# Patient Record
Sex: FEMALE | Race: WHITE | Employment: OTHER
[De-identification: names, ages, dates, MRNs, and addresses within clinical notes are randomized per-mention and may not be internally consistent; named-entity substitution may affect disease eponyms.]

---

## 2021-02-05 ENCOUNTER — NURSE TRIAGE (OUTPATIENT)
Dept: OTHER | Facility: CLINIC | Age: 46
End: 2021-02-05

## 2021-02-05 NOTE — TELEPHONE ENCOUNTER
Reason for Disposition   Constant abdominal pain lasting > 2 hours    Answer Assessment - Initial Assessment Questions  1. LOCATION: \"Where does it hurt? \"       Abdominal pain in mid lower abdomen to RLQ abdomen. 2. RADIATION: \"Does the pain shoot anywhere else? \" (e.g., chest, back)      Denies. 3. ONSET: \"When did the pain begin? \" (e.g., minutes, hours or days ago)       Began yesterday morning. 4. SUDDEN: \"Gradual or sudden onset? \"      Came on suddenly, woke her from a sleep at 0200.    5. PATTERN \"Does the pain come and go, or is it constant? \"     - If constant: \"Is it getting better, staying the same, or worsening? \"       (Note: Constant means the pain never goes away completely; most serious pain is constant and it progresses)      - If intermittent: \"How long does it last?\" \"Do you have pain now? \"      (Note: Intermittent means the pain goes away completely between bouts)      Constant with variation in severity. 6. SEVERITY: \"How bad is the pain? \"  (e.g., Scale 1-10; mild, moderate, or severe)    - MILD (1-3): doesn't interfere with normal activities, abdomen soft and not tender to touch     - MODERATE (4-7): interferes with normal activities or awakens from sleep, tender to touch     - SEVERE (8-10): excruciating pain, doubled over, unable to do any normal activities       5/10    7. RECURRENT SYMPTOM: \"Have you ever had this type of abdominal pain before? \" If so, ask: \"When was the last time? \" and \"What happened that time? \"       Denies having this pain in the past.    8. CAUSE: \"What do you think is causing the abdominal pain? \"      Unsure. Patient mentions eating fast food twice yesterday which is out of the ordinary for her. 9. RELIEVING/AGGRAVATING FACTORS: \"What makes it better or worse? \" (e.g., movement, antacids, bowel movement)      Pushing on the RLQ makes the pain worse. 10. OTHER SYMPTOMS: \"Has there been any vomiting, diarrhea, constipation, or urine problems? \" Patient endorsing yellow bilious diarrhea and vomiting. 11. PREGNANCY: \"Is there any chance you are pregnant? \" \"When was your last menstrual period? \"        Denies. LMP: 1/22/2021    Protocols used: ABDOMINAL PAIN [facility]    Brief description of triage: Abdominal pain mid to RLQ since last night, diarrhea and vomiting. Triage indicates for patient to go to [facility]. Patient asked for assistance finding urgent cares in network, assisted patient to find urgent cares in network. Patient had questions about copays and coverage amounts, referred patient to customer service regarding questions in that area. Care advice provided, patient verbalizes understanding; denies any other questions or concerns; instructed to call back for any new or worsening symptoms. This triage is a result of a call to [address] [place] [address]. Please do not respond to the triage nurse through this encounter. Any subsequent communication should be directly with the patient.